# Patient Record
Sex: FEMALE | Race: OTHER
[De-identification: names, ages, dates, MRNs, and addresses within clinical notes are randomized per-mention and may not be internally consistent; named-entity substitution may affect disease eponyms.]

---

## 2019-11-09 ENCOUNTER — HOSPITAL ENCOUNTER (EMERGENCY)
Dept: HOSPITAL 12 - ER | Age: 24
Discharge: HOME | End: 2019-11-09
Payer: COMMERCIAL

## 2019-11-09 VITALS — WEIGHT: 130 LBS | HEIGHT: 61 IN | BODY MASS INDEX: 24.55 KG/M2

## 2019-11-09 VITALS — DIASTOLIC BLOOD PRESSURE: 68 MMHG | SYSTOLIC BLOOD PRESSURE: 111 MMHG

## 2019-11-09 DIAGNOSIS — F10.129: Primary | ICD-10-CM

## 2019-11-09 DIAGNOSIS — R11.2: ICD-10-CM
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
## 2019-11-09 DIAGNOSIS — Z79.899: ICD-10-CM

## 2019-11-09 NOTE — NUR
Patient is AOx4, ambulated to bathroom with steady gait.  Urine specimen was 
requested from patient.

## 2019-11-09 NOTE — NUR
Patient discharged to home in stable conditon.  Written and verbal after care 
instructions given. 

Patient verbalizes understanding of instructions. Ambulated from ER with stable 
gait. All belongings with patient. Driven home by significant other in private 
vehicle.